# Patient Record
Sex: MALE | Employment: OTHER | URBAN - METROPOLITAN AREA
[De-identification: names, ages, dates, MRNs, and addresses within clinical notes are randomized per-mention and may not be internally consistent; named-entity substitution may affect disease eponyms.]

---

## 2020-05-11 ENCOUNTER — APPOINTMENT (OUTPATIENT)
Dept: GENERAL RADIOLOGY | Age: 65
DRG: 481 | End: 2020-05-11
Attending: EMERGENCY MEDICINE
Payer: MEDICARE

## 2020-05-11 ENCOUNTER — HOSPITAL ENCOUNTER (INPATIENT)
Facility: HOSPITAL | Age: 65
LOS: 1 days | Discharge: HOME | DRG: 481 | End: 2020-05-13
Attending: EMERGENCY MEDICINE | Admitting: ORTHOPAEDIC SURGERY
Payer: MEDICARE

## 2020-05-11 DIAGNOSIS — S72.002A CLOSED FRACTURE OF LEFT HIP, INITIAL ENCOUNTER (HCC): Primary | ICD-10-CM

## 2020-05-11 PROCEDURE — 73502 X-RAY EXAM HIP UNI 2-3 VIEWS: CPT | Performed by: EMERGENCY MEDICINE

## 2020-05-11 PROCEDURE — 73560 X-RAY EXAM OF KNEE 1 OR 2: CPT | Performed by: EMERGENCY MEDICINE

## 2020-05-11 PROCEDURE — 71045 X-RAY EXAM CHEST 1 VIEW: CPT | Performed by: EMERGENCY MEDICINE

## 2020-05-11 RX ORDER — TAMSULOSIN HYDROCHLORIDE 0.4 MG/1
0.4 CAPSULE ORAL 2 TIMES DAILY
COMMUNITY

## 2020-05-11 RX ORDER — MORPHINE SULFATE 4 MG/ML
4 INJECTION, SOLUTION INTRAMUSCULAR; INTRAVENOUS ONCE
Status: COMPLETED | OUTPATIENT
Start: 2020-05-11 | End: 2020-05-11

## 2020-05-11 RX ORDER — SODIUM CHLORIDE 9 MG/ML
INJECTION, SOLUTION INTRAVENOUS ONCE
Status: COMPLETED | OUTPATIENT
Start: 2020-05-11 | End: 2020-05-12

## 2020-05-12 ENCOUNTER — ANESTHESIA EVENT (OUTPATIENT)
Dept: SURGERY | Facility: HOSPITAL | Age: 65
DRG: 481 | End: 2020-05-12
Payer: MEDICARE

## 2020-05-12 ENCOUNTER — ANESTHESIA (OUTPATIENT)
Dept: SURGERY | Facility: HOSPITAL | Age: 65
DRG: 481 | End: 2020-05-12
Payer: MEDICARE

## 2020-05-12 ENCOUNTER — APPOINTMENT (OUTPATIENT)
Dept: GENERAL RADIOLOGY | Facility: HOSPITAL | Age: 65
DRG: 481 | End: 2020-05-12
Attending: ORTHOPAEDIC SURGERY
Payer: MEDICARE

## 2020-05-12 PROBLEM — S72.002A CLOSED FRACTURE OF LEFT HIP, INITIAL ENCOUNTER (HCC): Status: ACTIVE | Noted: 2020-05-12

## 2020-05-12 PROBLEM — S72.002A HIP FRACTURE REQUIRING OPERATIVE REPAIR, LEFT, CLOSED, INITIAL ENCOUNTER (HCC): Status: ACTIVE | Noted: 2020-05-12

## 2020-05-12 PROCEDURE — 0QS734Z REPOSITION LEFT UPPER FEMUR WITH INTERNAL FIXATION DEVICE, PERCUTANEOUS APPROACH: ICD-10-PCS | Performed by: ORTHOPAEDIC SURGERY

## 2020-05-12 PROCEDURE — 99222 1ST HOSP IP/OBS MODERATE 55: CPT | Performed by: HOSPITALIST

## 2020-05-12 PROCEDURE — 3E0T3BZ INTRODUCTION OF ANESTHETIC AGENT INTO PERIPHERAL NERVES AND PLEXI, PERCUTANEOUS APPROACH: ICD-10-PCS | Performed by: STUDENT IN AN ORGANIZED HEALTH CARE EDUCATION/TRAINING PROGRAM

## 2020-05-12 PROCEDURE — 76000 FLUOROSCOPY <1 HR PHYS/QHP: CPT | Performed by: ORTHOPAEDIC SURGERY

## 2020-05-12 DEVICE — IMPLANTABLE DEVICE: Type: IMPLANTABLE DEVICE | Status: FUNCTIONAL

## 2020-05-12 RX ORDER — ONDANSETRON 2 MG/ML
4 INJECTION INTRAMUSCULAR; INTRAVENOUS AS NEEDED
Status: DISCONTINUED | OUTPATIENT
Start: 2020-05-12 | End: 2020-05-12 | Stop reason: HOSPADM

## 2020-05-12 RX ORDER — DEXAMETHASONE SODIUM PHOSPHATE 4 MG/ML
VIAL (ML) INJECTION AS NEEDED
Status: DISCONTINUED | OUTPATIENT
Start: 2020-05-12 | End: 2020-05-12 | Stop reason: SURG

## 2020-05-12 RX ORDER — ACETAMINOPHEN 325 MG/1
650 TABLET ORAL EVERY 6 HOURS PRN
Status: DISCONTINUED | OUTPATIENT
Start: 2020-05-12 | End: 2020-05-13

## 2020-05-12 RX ORDER — HYDROMORPHONE HYDROCHLORIDE 1 MG/ML
0.4 INJECTION, SOLUTION INTRAMUSCULAR; INTRAVENOUS; SUBCUTANEOUS EVERY 5 MIN PRN
Status: DISCONTINUED | OUTPATIENT
Start: 2020-05-12 | End: 2020-05-12 | Stop reason: HOSPADM

## 2020-05-12 RX ORDER — NALOXONE HYDROCHLORIDE 0.4 MG/ML
80 INJECTION, SOLUTION INTRAMUSCULAR; INTRAVENOUS; SUBCUTANEOUS AS NEEDED
Status: DISCONTINUED | OUTPATIENT
Start: 2020-05-12 | End: 2020-05-12 | Stop reason: HOSPADM

## 2020-05-12 RX ORDER — METOCLOPRAMIDE HYDROCHLORIDE 5 MG/ML
10 INJECTION INTRAMUSCULAR; INTRAVENOUS EVERY 8 HOURS PRN
Status: DISCONTINUED | OUTPATIENT
Start: 2020-05-12 | End: 2020-05-13

## 2020-05-12 RX ORDER — SODIUM PHOSPHATE, DIBASIC AND SODIUM PHOSPHATE, MONOBASIC 7; 19 G/133ML; G/133ML
1 ENEMA RECTAL ONCE AS NEEDED
Status: DISCONTINUED | OUTPATIENT
Start: 2020-05-12 | End: 2020-05-13

## 2020-05-12 RX ORDER — POLYETHYLENE GLYCOL 3350 17 G/17G
17 POWDER, FOR SOLUTION ORAL DAILY PRN
Status: DISCONTINUED | OUTPATIENT
Start: 2020-05-12 | End: 2020-05-13

## 2020-05-12 RX ORDER — DIPHENHYDRAMINE HYDROCHLORIDE 50 MG/ML
INJECTION INTRAMUSCULAR; INTRAVENOUS AS NEEDED
Status: DISCONTINUED | OUTPATIENT
Start: 2020-05-12 | End: 2020-05-12 | Stop reason: SURG

## 2020-05-12 RX ORDER — HYDROMORPHONE HYDROCHLORIDE 1 MG/ML
0.5 INJECTION, SOLUTION INTRAMUSCULAR; INTRAVENOUS; SUBCUTANEOUS EVERY 30 MIN PRN
Status: DISPENSED | OUTPATIENT
Start: 2020-05-12 | End: 2020-05-12

## 2020-05-12 RX ORDER — ONDANSETRON 2 MG/ML
4 INJECTION INTRAMUSCULAR; INTRAVENOUS EVERY 6 HOURS PRN
Status: DISCONTINUED | OUTPATIENT
Start: 2020-05-12 | End: 2020-05-13

## 2020-05-12 RX ORDER — OXYCODONE HYDROCHLORIDE 5 MG/1
10 TABLET ORAL EVERY 4 HOURS PRN
Status: DISCONTINUED | OUTPATIENT
Start: 2020-05-12 | End: 2020-05-13

## 2020-05-12 RX ORDER — ONDANSETRON 2 MG/ML
4 INJECTION INTRAMUSCULAR; INTRAVENOUS EVERY 4 HOURS PRN
Status: DISCONTINUED | OUTPATIENT
Start: 2020-05-12 | End: 2020-05-13

## 2020-05-12 RX ORDER — CEFAZOLIN SODIUM 1 G/3ML
INJECTION, POWDER, FOR SOLUTION INTRAMUSCULAR; INTRAVENOUS AS NEEDED
Status: DISCONTINUED | OUTPATIENT
Start: 2020-05-12 | End: 2020-05-12 | Stop reason: SURG

## 2020-05-12 RX ORDER — OXYCODONE HYDROCHLORIDE 15 MG/1
15 TABLET ORAL EVERY 4 HOURS PRN
Status: DISCONTINUED | OUTPATIENT
Start: 2020-05-12 | End: 2020-05-13

## 2020-05-12 RX ORDER — HYDROMORPHONE HYDROCHLORIDE 1 MG/ML
0.8 INJECTION, SOLUTION INTRAMUSCULAR; INTRAVENOUS; SUBCUTANEOUS EVERY 2 HOUR PRN
Status: DISCONTINUED | OUTPATIENT
Start: 2020-05-12 | End: 2020-05-13

## 2020-05-12 RX ORDER — BISACODYL 10 MG
10 SUPPOSITORY, RECTAL RECTAL
Status: DISCONTINUED | OUTPATIENT
Start: 2020-05-12 | End: 2020-05-13

## 2020-05-12 RX ORDER — SODIUM PHOSPHATE, DIBASIC AND SODIUM PHOSPHATE, MONOBASIC 7; 19 G/133ML; G/133ML
1 ENEMA RECTAL ONCE AS NEEDED
Status: DISCONTINUED | OUTPATIENT
Start: 2020-05-12 | End: 2020-05-12

## 2020-05-12 RX ORDER — POLYETHYLENE GLYCOL 3350 17 G/17G
17 POWDER, FOR SOLUTION ORAL DAILY PRN
Status: DISCONTINUED | OUTPATIENT
Start: 2020-05-12 | End: 2020-05-12

## 2020-05-12 RX ORDER — DEXAMETHASONE SODIUM PHOSPHATE 4 MG/ML
4 VIAL (ML) INJECTION AS NEEDED
Status: DISCONTINUED | OUTPATIENT
Start: 2020-05-12 | End: 2020-05-12 | Stop reason: HOSPADM

## 2020-05-12 RX ORDER — SODIUM CHLORIDE, SODIUM LACTATE, POTASSIUM CHLORIDE, CALCIUM CHLORIDE 600; 310; 30; 20 MG/100ML; MG/100ML; MG/100ML; MG/100ML
INJECTION, SOLUTION INTRAVENOUS CONTINUOUS
Status: DISCONTINUED | OUTPATIENT
Start: 2020-05-12 | End: 2020-05-12 | Stop reason: HOSPADM

## 2020-05-12 RX ORDER — ROCURONIUM BROMIDE 10 MG/ML
INJECTION, SOLUTION INTRAVENOUS AS NEEDED
Status: DISCONTINUED | OUTPATIENT
Start: 2020-05-12 | End: 2020-05-12 | Stop reason: SURG

## 2020-05-12 RX ORDER — DOCUSATE SODIUM 100 MG/1
100 CAPSULE, LIQUID FILLED ORAL 2 TIMES DAILY
Status: DISCONTINUED | OUTPATIENT
Start: 2020-05-12 | End: 2020-05-13

## 2020-05-12 RX ORDER — DIPHENHYDRAMINE HYDROCHLORIDE 50 MG/ML
12.5 INJECTION INTRAMUSCULAR; INTRAVENOUS AS NEEDED
Status: DISCONTINUED | OUTPATIENT
Start: 2020-05-12 | End: 2020-05-12 | Stop reason: HOSPADM

## 2020-05-12 RX ORDER — GARLIC EXTRACT 500 MG
1 CAPSULE ORAL 2 TIMES DAILY
COMMUNITY

## 2020-05-12 RX ORDER — MEPERIDINE HYDROCHLORIDE 25 MG/ML
INJECTION INTRAMUSCULAR; INTRAVENOUS; SUBCUTANEOUS
Status: COMPLETED
Start: 2020-05-12 | End: 2020-05-12

## 2020-05-12 RX ORDER — OXYCODONE HYDROCHLORIDE 5 MG/1
5 TABLET ORAL EVERY 4 HOURS PRN
Status: DISCONTINUED | OUTPATIENT
Start: 2020-05-12 | End: 2020-05-13

## 2020-05-12 RX ORDER — SODIUM CHLORIDE 9 MG/ML
INJECTION, SOLUTION INTRAVENOUS CONTINUOUS
Status: DISCONTINUED | OUTPATIENT
Start: 2020-05-12 | End: 2020-05-13

## 2020-05-12 RX ORDER — MIDAZOLAM HYDROCHLORIDE 1 MG/ML
1 INJECTION INTRAMUSCULAR; INTRAVENOUS EVERY 5 MIN PRN
Status: DISCONTINUED | OUTPATIENT
Start: 2020-05-12 | End: 2020-05-12 | Stop reason: HOSPADM

## 2020-05-12 RX ORDER — ENOXAPARIN SODIUM 100 MG/ML
40 INJECTION SUBCUTANEOUS DAILY
Status: DISCONTINUED | OUTPATIENT
Start: 2020-05-13 | End: 2020-05-13

## 2020-05-12 RX ORDER — HYDROMORPHONE HYDROCHLORIDE 1 MG/ML
0.2 INJECTION, SOLUTION INTRAMUSCULAR; INTRAVENOUS; SUBCUTANEOUS EVERY 2 HOUR PRN
Status: DISCONTINUED | OUTPATIENT
Start: 2020-05-12 | End: 2020-05-13

## 2020-05-12 RX ORDER — VANCOMYCIN/0.9 % SOD CHLORIDE 1.75 G/5
15 PLASTIC BAG, INJECTION (ML) INTRAVENOUS ONCE
Status: COMPLETED | OUTPATIENT
Start: 2020-05-12 | End: 2020-05-12

## 2020-05-12 RX ORDER — HYDROMORPHONE HYDROCHLORIDE 1 MG/ML
0.4 INJECTION, SOLUTION INTRAMUSCULAR; INTRAVENOUS; SUBCUTANEOUS EVERY 2 HOUR PRN
Status: DISCONTINUED | OUTPATIENT
Start: 2020-05-12 | End: 2020-05-13

## 2020-05-12 RX ORDER — DOCUSATE SODIUM 100 MG/1
100 CAPSULE, LIQUID FILLED ORAL 2 TIMES DAILY
Status: DISCONTINUED | OUTPATIENT
Start: 2020-05-12 | End: 2020-05-12

## 2020-05-12 RX ORDER — MEPERIDINE HYDROCHLORIDE 25 MG/ML
12.5 INJECTION INTRAMUSCULAR; INTRAVENOUS; SUBCUTANEOUS AS NEEDED
Status: DISCONTINUED | OUTPATIENT
Start: 2020-05-12 | End: 2020-05-12 | Stop reason: HOSPADM

## 2020-05-12 RX ORDER — LIDOCAINE HYDROCHLORIDE 10 MG/ML
INJECTION, SOLUTION EPIDURAL; INFILTRATION; INTRACAUDAL; PERINEURAL AS NEEDED
Status: DISCONTINUED | OUTPATIENT
Start: 2020-05-12 | End: 2020-05-12 | Stop reason: SURG

## 2020-05-12 RX ORDER — MULTIVIT-MIN/IRON/FOLIC ACID/K 18-600-40
1200 CAPSULE ORAL 2 TIMES DAILY
COMMUNITY

## 2020-05-12 RX ORDER — METOCLOPRAMIDE HYDROCHLORIDE 5 MG/ML
10 INJECTION INTRAMUSCULAR; INTRAVENOUS AS NEEDED
Status: DISCONTINUED | OUTPATIENT
Start: 2020-05-12 | End: 2020-05-12 | Stop reason: HOSPADM

## 2020-05-12 RX ORDER — BISACODYL 10 MG
10 SUPPOSITORY, RECTAL RECTAL
Status: DISCONTINUED | OUTPATIENT
Start: 2020-05-12 | End: 2020-05-12

## 2020-05-12 RX ADMIN — LIDOCAINE HYDROCHLORIDE 50 MG: 10 INJECTION, SOLUTION EPIDURAL; INFILTRATION; INTRACAUDAL; PERINEURAL at 16:10:00

## 2020-05-12 RX ADMIN — DIPHENHYDRAMINE HYDROCHLORIDE 12.5 MG: 50 INJECTION INTRAMUSCULAR; INTRAVENOUS at 16:10:00

## 2020-05-12 RX ADMIN — DEXAMETHASONE SODIUM PHOSPHATE 4 MG: 4 MG/ML VIAL (ML) INJECTION at 16:10:00

## 2020-05-12 RX ADMIN — ONDANSETRON 4 MG: 2 INJECTION INTRAMUSCULAR; INTRAVENOUS at 17:12:00

## 2020-05-12 RX ADMIN — ROCURONIUM BROMIDE 50 MG: 10 INJECTION, SOLUTION INTRAVENOUS at 16:15:00

## 2020-05-12 RX ADMIN — CEFAZOLIN SODIUM 2 G: 1 INJECTION, POWDER, FOR SOLUTION INTRAMUSCULAR; INTRAVENOUS at 16:22:00

## 2020-05-12 NOTE — ANESTHESIA POSTPROCEDURE EVALUATION
57 [facility] Patient Status:  Inpatient   Age/Gender 77year old male MRN [id number]   Location [address] [facility] Attending Alyson To, [address] [facility] Day # 0 PCP Tani Lopez, DO       Anesthesia Post-op Note    Proced

## 2020-05-12 NOTE — ED PROVIDER NOTES
Patient Seen in: THE [facility] Emergency Department In [facility]      History   Patient presents with:  Lower Extremity Injury  Trauma    Stated Complaint: left hip pain post fall    HPI    78-year-old white male who presents emerged from today for complaint o signs are stable he is afebrile  Head is normocephalic atraumatic conjunctiva is clear. Sclerae nonicteric. Neck is supple. Lungs are clear to auscultation bilaterally heart rates regular rate and rhythm without murmur gallop or rub.   No chest wall tend Mildly displaced acute fracture through the left femoral neck. There is mild inferior displacement of the distal fracture fragment. Moderate osteoarthritis within the hips. CONCLUSION:  Mildly displaced acute fracture through the left femoral neck.

## 2020-05-12 NOTE — PLAN OF CARE
Pt remains NPO for surgery this afternoon by Dr. Leidy Garcia. Surgery consent signed. States pain minimal at rest.  Declined home health for dc. Will continue to monitor.

## 2020-05-12 NOTE — ANESTHESIA POSTPROCEDURE EVALUATION
[address] [facility] [address] [facility] [address] Patient Status:  Inpatient   Age/Gender 77year old male MRN [id number]   [facility] 3NW-A Attending Toby Childers DO   Hosp Day # 0 PCP Bernice Lopez DO       Anesthesia Post-op Note    Procedure(s):  open reduct

## 2020-05-12 NOTE — HOME CARE LIAISON
TNL spoke with RN about HH needs on dc. RN ok for me see ptnt to offer [place] on dc. Ptnt declined stated he has help on dc.     Thanks  Roman Mendoza

## 2020-05-12 NOTE — PROGRESS NOTES
NURSING ADMISSION NOTE      Patient admitted via [facility] to room. Safety precautions initiated. Bed in low position. Call light in reach. Routine admission care rendered,oriented to room and use of call light. Plan of care discussed wi

## 2020-05-12 NOTE — H&P
[facility]OSPITAL[facility]  History and Physical     Lawrence Forde Patient Status:  Emergency    1955 MRN [id number]   Location [facility] [place] Attending Alina Herzog MD   Hosp Day # 0 PCP Lance Bourne DO     Chief Complaint: F nontender, nondistended. Positive bowel sounds. No rebound, guarding or organomegaly. Musculoskeletal: Moves all extremities. Extremities: No edema or cyanosis. Integument: No rashes or lesions. Psychiatric: Appropriate mood and affect.       Diagnost

## 2020-05-12 NOTE — CONSULTS
[facility]  Report of Consultation    Ofe Maharaj Patient Status:  Inpatient    1955 MRN [id number]   [facility] 3NW-A Attending Marlene Reyes DO   Hosp Day # 0 PCP Vandana Smith DO     Reason for Consultation:  L hip pain    Histo MAGNESIA) 400 MG/5ML suspension 30 mL, 30 mL, Oral, Daily PRN  •  bisacodyl (DULCOLAX) rectal suppository 10 mg, 10 mg, Rectal, Daily PRN  •  Fleet Enema (FLEET) 7-19 GM/118ML enema 133 mL, 1 enema, Rectal, Once PRN    Review of Systems:  A comprehensive r CREATSERUM 0.71 05/11/2020    BUN 15 05/11/2020     05/11/2020    K 4.3 05/11/2020     05/11/2020    CO2 27.0 05/11/2020     05/11/2020    CA 8.9 05/11/2020    ALB 3.6 05/11/2020    ALKPHO 70 05/11/2020    BILT 0.4 05/11/2020    TP 7.4 0

## 2020-05-12 NOTE — PLAN OF CARE
Alert and oriented,IVF infusing,kept  NPO,ORTHO to see,( was notified in ER). Medicated for pain with adequate relief. Safety measures reinforced,call light kept within reach,instructed to call for help or assist,verbalizes understanding.

## 2020-05-12 NOTE — ANESTHESIA PREPROCEDURE EVALUATION
PRE-OP EVALUATION    Patient Name: Verle Notice    Pre-op Diagnosis: Hip fracture, left ([address] Pura Plush    Procedure(s):  open reduction intternal fixation left hip- percutaneous cannulated screws left hip     Surgeon(s) and Role:     Nell Dunn MD Rfl:   Enzalutamide (XTANDI) 40 MG Oral Cap, Take 160 mg by mouth daily. , Disp: , Rfl:         Allergies: Penicillins      Anesthesia Evaluation    Patient summary reviewed.     Anesthetic Complications  (-) history of anesthetic complications         GI/

## 2020-05-12 NOTE — BRIEF OP NOTE
Pre-Operative Diagnosis: Hip fracture, left (HCC) [S72.002A]     Post-Operative Diagnosis: * No post-op diagnosis entered *      Procedure Performed:   Procedure(s):  open reduction intternal fixation left hip- percutaneous cannulated screws left hip     S

## 2020-05-12 NOTE — ANESTHESIA PROCEDURE NOTES
Regional Block  Performed by: Terence Chaney MD  Authorized by: Terence Chaney MD       General Information and Staff    Start Time:  5/12/2020 5:00 PM  End Time:  5/12/2020 5:09 PM  Anesthesiologist:  Terence Chaney MD  Performed by:   Anesthes

## 2020-05-12 NOTE — ANESTHESIA PROCEDURE NOTES
Airway  Date/Time: 5/12/2020 4:13 PM  Urgency: elective      General Information and Staff    Patient location during procedure: OR  Anesthesiologist: Shey Cabrera MD  Performed: anesthesiologist     Indications and Patient Condition  Indications for

## 2020-05-12 NOTE — ED INITIAL ASSESSMENT (HPI)
Patient fell backwards on concrete floor after trying to get over baby gait. Complaints of left knee, hip, and rib pain. Denies thinners, denies head or neck pain. Denies loc.

## 2020-05-12 NOTE — ED NOTES
Arrived via EMS from PED, alert, oriented x4. Pt in no acute distress. States L hip pain, \"better now, 4/10. \" Pt updated of the POC.  Call light in reach

## 2020-05-12 NOTE — ED NOTES
Report given to SRI Eckert. Per transport line, no transport available at this time and will attempt to turnover to another company. Awaiting callback.  Patient resting on the stretcher, respirations even and unlabored

## 2020-05-12 NOTE — PLAN OF CARE
Returned from surgery. Drowsy, MADRIGAL. Dressing dry and intact to left hip. Voiding without difficulty. IVF's as ordered. Denies need for pain med. Ice to surgical site. Will continue to monitor.

## 2020-05-12 NOTE — PROGRESS NOTES
77year old Male admitted overnight with Acute left femoral neck fracture following a fall.   PMH only significant for Prostate Ca  Hemodynamically stable    Patient is medically optimized to proceed with surgery

## 2020-05-13 VITALS
RESPIRATION RATE: 18 BRPM | HEIGHT: 75 IN | WEIGHT: 236 LBS | OXYGEN SATURATION: 100 % | DIASTOLIC BLOOD PRESSURE: 67 MMHG | BODY MASS INDEX: 29.34 KG/M2 | SYSTOLIC BLOOD PRESSURE: 128 MMHG | TEMPERATURE: 98 F | HEART RATE: 76 BPM

## 2020-05-13 PROCEDURE — 99239 HOSP IP/OBS DSCHRG MGMT >30: CPT | Performed by: HOSPITALIST

## 2020-05-13 RX ORDER — ACETAMINOPHEN AND CODEINE PHOSPHATE 300; 30 MG/1; MG/1
1-2 TABLET ORAL EVERY 6 HOURS PRN
Qty: 10 TABLET | Refills: 1 | Status: SHIPPED | OUTPATIENT
Start: 2020-05-13

## 2020-05-13 RX ORDER — ACETAMINOPHEN AND CODEINE PHOSPHATE 300; 30 MG/1; MG/1
2 TABLET ORAL EVERY 4 HOURS PRN
Status: DISCONTINUED | OUTPATIENT
Start: 2020-05-13 | End: 2020-05-13

## 2020-05-13 RX ORDER — ACETAMINOPHEN AND CODEINE PHOSPHATE 300; 30 MG/1; MG/1
1 TABLET ORAL EVERY 4 HOURS PRN
Status: DISCONTINUED | OUTPATIENT
Start: 2020-05-13 | End: 2020-05-13

## 2020-05-13 RX ORDER — ASPIRIN 325 MG
325 TABLET, DELAYED RELEASE (ENTERIC COATED) ORAL 2 TIMES DAILY
Qty: 28 TABLET | Refills: 0 | Status: SHIPPED | OUTPATIENT
Start: 2020-05-13 | End: 2020-05-27

## 2020-05-13 NOTE — PROGRESS NOTES
Post Op Day 1 s/p  Left femoral neck fracture percutaneous screw fixation.     Status Post Nerve Block:  Type of Nerve Block: Left Femoral  Single Injection Nerve Block    Post op review: No evidence of immediate block related complications, No paresthesia

## 2020-05-13 NOTE — PLAN OF CARE
Pt received AOX4. Still has decreased sensation on his LLE. Aquacel dressing is C/D/I - ice pack maintained. Kept regular pillow in between his legs. PT/OT. Educated re IS use. Ankle pumps encouraged.  Instructed pt to call for assistance, call light within

## 2020-05-13 NOTE — PHYSICAL THERAPY NOTE
PHYSICAL THERAPY QUICK EVALUATION - INPATIENT    Room Number: 329/329-A  Evaluation Date: 5/13/2020  Presenting Problem: s/pleft hip ORIF 5/12/20  Physician Order: PT Eval and Treat    Problem List  Principal Problem:    Closed fracture of left hip, init - BASIC MOBILITY  How much difficulty does the patient currently have. ..  -   Turning over in bed (including adjusting bedclothes, sheets and blankets)?: None   -   Sitting down on and standing up from a chair with arms (e.g., wheelchair, bedside commode, and pct, assist recommendations. Pt reporting he has rw for home use but no longer has crutches, issued for home use. Patient End of Session: Up in chair;Needs met;Call light within reach;RN aware of session/findings; All patient questions and conc

## 2020-05-13 NOTE — PLAN OF CARE
Pt discharge education completed. All questions addressed. All pt belongings packed and sent with pt. Tyl#3 and ASA delivered by [facility]. Discharged home via personal car.

## 2020-05-13 NOTE — OCCUPATIONAL THERAPY NOTE
OCCUPATIONAL THERAPY QUICK EVALUATION - INPATIENT    Room Number: 329/329-A  Evaluation Date: 5/13/2020     Type of Evaluation: Quick Eval  Presenting Problem: s/p L hip ORIF 5/12/20    Physician Order: Alber Murphy Consult to Occupational Therapy  Reason for [facility] techniques;Relaxation;Repositioning    COGNITION  WFL    RANGE OF MOTION AND STRENGTH ASSESSMENT  Upper extremity ROM is within functional limits     Upper extremity strength is within functional limits     NEUROLOGICAL FINDINGS  Neurological Findings: Non supervision at home), car transfers, return to [facility] (reports cousin will be staying initially to assist) with good verbal understanding. Patient End of Session: Up in chair;Needs met;Call light within reach;RN aware of session/findings; All patient que goals:  Patient able to toilet transfer: At supervision level or above; patient reports will have supervision at home  Patient able to dress lower extremities:  At supervision level or above; patient reports will have supervision at home  Patient/Caregiver

## 2020-05-13 NOTE — PLAN OF CARE
Pt pain controlled with Tylenol. Pt declines stronger pain meds. VSS. Ambulating well with walker. Voiding without difficulty. OK to DC from all services. Plan: home today. Will monitor.

## 2020-05-13 NOTE — CM/SW NOTE
05/13/20 1100   CM/SW Referral Data   Referral Source Physician   Reason for Referral Discharge planning   Informant Patient   Patient Info   Patient's Mental Status Alert;Oriented   Patient's Home Environment House   Patient lives with Alone  (daughter

## 2020-05-13 NOTE — PROGRESS NOTES
[facility]  Progress Note    Ofe Maharaj Patient Status:  Inpatient    1955 MRN [id number]   [facility] 3NW-A Attending Erich Huntley MD   Hosp Day # 1 PCP Vandana Smith DO     Subjective:  Ofe Maharaj is a(n) 77year old male.  L

## 2020-05-14 NOTE — DISCHARGE SUMMARY
[facility]  DISCHARGE SUMMARY     Maria Esther Courtney Patient Status:  Inpatient    1955 MRN [id number]   [facility] 3NW-A Attending No att. providers found   Hosp Day # 1 PCP Óscar Bragg DO     Date of Admission: 2020  Date of Mary Karimi fracture percutaneous screw fixation.   •      Incidental or significant findings and recommendations (brief descriptions):  • Weightbearing as tolerated  • Aspirin twice daily 14 days  • Tylenol 3 for pain  • Follow-up with orthopedics    Lab/Test results Vital signs:  Temp:  [98 °F (36.7 °C)-98.3 °F (36.8 °C)] 98 °F (36.7 °C)  Pulse:  [74-80] 76  Resp:  [18-20] 18  BP: (114-128)/(66-70) 128/67    Physical Exam:    General: Alert, orientated x3. Cooperative. No apparent distress.   Vital Signs:  Bloo

## (undated) DEVICE — SUTURE VICRYL 2-0 CP-1

## (undated) DEVICE — SOL  .9 1000ML BTL

## (undated) DEVICE — Device

## (undated) DEVICE — DRESSING AQUACEL AG 3.5X3.75

## (undated) DEVICE — SUTURE VICRYL 1 OS-6

## (undated) DEVICE — PROXIMATE SKIN STAPLERS (35 WIDE) CONTAINS 35 STAINLESS STEEL STAPLES (FIXED HEAD): Brand: PROXIMATE

## (undated) DEVICE — HIP PINNING CDS: Brand: MEDLINE INDUSTRIES, INC.

## (undated) DEVICE — POSITIONER OR KT PT CR

## (undated) DEVICE — GAUZE SPONGES,12 PLY: Brand: CURITY

## (undated) DEVICE — STERILE SYNTHETIC POLYISOPRENE POWDER-FREE SURGICAL GLOVES WITH HYDROGEL COATING: Brand: PROTEXIS

## (undated) DEVICE — STERILE POLYISOPRENE POWDER-FREE SURGICAL GLOVES: Brand: PROTEXIS

## (undated) DEVICE — KENDALL SCD EXPRESS SLEEVES, KNEE LENGTH, MEDIUM: Brand: KENDALL SCD

## (undated) DEVICE — 3M™ MICROFOAM™ TAPE 1528-4: Brand: 3M™ MICROFOAM™

## (undated) DEVICE — Device: Brand: BOOT LINER, DISPOSABLE

## (undated) DEVICE — GOWN SURG AERO CHROME XXL

## (undated) DEVICE — DRESSING AQUACEL AG 3.5 X 6

## (undated) DEVICE — GUIDEPIN ORTH 12IN 3.2MM P/T

## (undated) NOTE — LETTER
Elisha Villarreal [address]  [place], [address] [facility]    Consent for Operation  Date: __________________                                Time: _______________    1.  I authorize the performance upon Rekha Villagran the following operation:  Procedure(s) procedure has been videotaped, the surgeon will obtain the original videotape. The hospital will not be responsible for storage or maintenance of this tape.   7. For the purpose of advancing medical education, I consent to the admittance of observers to the STATEMENTS REQUIRING INSERTION OR COMPLETION WERE FILLED IN.     Signature of Patient:   ___________________________    When the patient is a minor or mentally incompetent to give consent:  Signature of person authorized to consent for patient: ____________ drugs/illegal medications). Failure to inform my anesthesiologist about these medicines may increase my risk of anesthetic complications. iv. If I am allergic to anything or have had a reaction to anesthesia before.   3. I understand how the anesthesia med I have discussed the procedure and information above with the patient (or patient’s representative) and answered their questions. The patient or their representative has agreed to have anesthesia services.     _______________________________________________